# Patient Record
(demographics unavailable — no encounter records)

---

## 2024-10-31 NOTE — REASON FOR VISIT
Improving   Lost 20 pounds on mounjaro  Continue diet, exercise, portion control    [Initial] : an initial visit [Sleep Apnea] : sleep apnea [Obesity] : obesity [TextBox_44] : Prior covid infection.

## 2024-10-31 NOTE — REVIEW OF SYSTEMS
[Fatigue] : fatigue [Thyroid Problem] : thyroid problem [Obesity] : obesity [Negative] : Psychiatric [TextBox_119] : h/o vertigo

## 2024-10-31 NOTE — CONSULT LETTER
[Dear  ___] : Dear  [unfilled], [Consult Letter:] : I had the pleasure of evaluating your patient, [unfilled]. [Please see my note below.] : Please see my note below. [Consult Closing:] : Thank you very much for allowing me to participate in the care of this patient.  If you have any questions, please do not hesitate to contact me. [Sincerely,] : Sincerely, [FreeTextEntry3] : Dario Adam MD, FCCP, D. [facility] at [place]

## 2024-10-31 NOTE — HISTORY OF PRESENT ILLNESS
[Snoring] : snoring [Sleepy When Sedentary] : sleepy when sedentary [Initial Evaluation] : an initial evaluation of [Excess Weight] : excess weight [Currently Experiencing] : The patient is currently experiencing symptoms. [None] : No associated symptoms are reported [Low Calorie Diet] : low calorie diet [Fair Compliance] : fair compliance with treatment [Fair Tolerance] : fair tolerance of treatment [Fair Symptom Control] : fair symptom control [Sleep Apnea] : sleep apnea [Hypertension] : hypertension [TextBox_4] : Never smoker. S/p Covid infection 5/2022 with sinus congestion and loss of taste and smell with eventual resolution of symptoms. Pt denies any h/o asthma, COPD or significant smoking hx. Denies significant respiratory complaints. H/o bicuspid valve and aortic aneurysm and is following with cardiology and CT surgery and may require surgery in near future. Trying to lose weight and lost 40 lbs. Pt with mild sleep complaints and borderline RUTH with an AHI of 5.1. Given mild complaints, he does not want to proceed with therapy which is reasonable.   [Excessive Daytime Sleepiness] : no excessive daytime sleepiness [Witnessed Apnea During Sleep] : no witnessed apnea during sleep [Witnessed Gasping During Sleep] : no witnessed gasping during sleep [Unrefreshing Sleep] : no unrefreshing sleep [ESS] : 7 [TextBox_11] : 4

## 2024-10-31 NOTE — END OF VISIT
[Time Spent: ___ minutes] : I have spent [unfilled] minutes of time on the encounter which excludes teaching and separately reported services. [TextEntry] : Discussed with pt at length regarding borderline RUTH, obesity, prior Covid infection; reviewed w/u with pt as above.

## 2024-10-31 NOTE — DISCUSSION/SUMMARY
[FreeTextEntry1] : #1. Pt denies any h/o asthma, COPD or significant smoking hx. Denies significant respiratory complaints. #2. The patient does not appear to require chronic BD therapy at this time. #3. Diet and exercise for weight loss. #4. SOBOE is likely at least somewhat related to weight or deconditioning. #5. Home PSG to evaluate for possible RUTH revealed borderline RUTH with an AHI of 5.1 and given pt with mild sleep complaints and borderline RUTH, he does not want to proceed with therapy which is reasonable.  #6. Cardiology and CT surgery f/u for bicuspid valve and aneurysm. #7. Could consider repeat HST with weight loss to evaluate for resolution of borderline RUTH but not necessarily required. #8. S/p Covid vaccines and boosters. #9. F/u as needed.   The patient expressed understanding and agreement with the above recommendations/plan and accepts responsibility to be compliant with recommended testing, therapies, and f/u visits. All relevant questions and concerns were addressed.

## 2024-10-31 NOTE — PROCEDURE
[FreeTextEntry1] : Pt denies any h/o asthma, COPD or significant smoking hx. Denies significant respiratory complaints.

## 2024-10-31 NOTE — PHYSICAL EXAM
[No Acute Distress] : no acute distress [Normal Appearance] : normal appearance [Supple] : supple [Normal Rate/Rhythm] : normal rate/rhythm [Normal S1, S2] : normal s1, s2 [No Murmurs] : no murmurs [No Resp Distress] : no resp distress [No Acc Muscle Use] : no acc muscle use [Normal Rhythm and Effort] : normal rhythm and effort [Clear to Auscultation Bilaterally] : clear to auscultation bilaterally [No Abnormalities] : no abnormalities [Benign] : benign [Not Tender] : not tender [Soft] : soft [No Clubbing] : no clubbing [No Edema] : no edema [No Focal Deficits] : no focal deficits [Oriented x3] : oriented x3 [Low Lying Soft Palate] : low lying soft palate [Enlarged Base of the Tongue] : enlarged base of the tongue [II] : Mallampati Class: II [TextBox_11] : moderate oropharyngeal crowding.

## 2025-02-10 NOTE — HISTORY OF PRESENT ILLNESS
[FreeTextEntry1] : wellness exam [de-identified] : MR. CORTEZ IS A PLEASANT 65 YO PRESENTING FOR YEARLY WELLNESS EXAM.  IS FEELING WELL TODAY. CARDIOTHORACIC SURGERY: DR. SILVEIRA - IN APRIL PLANS FOR AORTIC VALVE REPLACEMENT AND ASCENDING AORTA REPLACEMENT CARDIOLOGY: DR. LUNDY PULMONOLOGY: DR. BURLESON OPHTHALMOLOGY: BART ASTORGA MD RADIATION ONCOLOGY: DR. GREEN UROLOGY: DR. WELCH NEUROLOGY: DR. WALTER DERMATOLOGY: DR. ESPINO GI: DR. BLACKWELL

## 2025-02-10 NOTE — HEALTH RISK ASSESSMENT
[Employed] : employed [College] : College [] :  [# Of Children ___] : has [unfilled] children [Designated Healthcare Proxy] : Designated healthcare proxy [Name: ___] : Health Care Proxy's Name: [unfilled]  [Relationship: ___] : Relationship: [unfilled] [Very Good] : ~his/her~  mood as very good [No] : In the past 12 months have you used drugs other than those required for medical reasons? No [Little interest or pleasure doing things] : 1) Little interest or pleasure doing things [Feeling down, depressed, or hopeless] : 2) Feeling down, depressed, or hopeless [0] : 2) Feeling down, depressed, or hopeless: Not at all (0) [PHQ-2 Negative - No further assessment needed] : PHQ-2 Negative - No further assessment needed [Never] : Never [No falls in past year] : Patient reported no falls in the past year [NO] : No [HIV test declined] : HIV test declined [Hepatitis C test declined] : Hepatitis C test declined [None] : None [With Family] : lives with family [# of Members in Household ___] :  household currently consist of [unfilled] member(s) [Feels Safe at Home] : Feels safe at home [Reports normal functional visual acuity (ie: able to read med bottle)] : Reports normal functional visual acuity [Smoke Detector] : smoke detector [Carbon Monoxide Detector] : carbon monoxide detector [Safety elements used in home] : safety elements used in home [Seat Belt] :  uses seat belt [With Patient/Caregiver] : , with patient/caregiver [de-identified] : NOT ROUTINELY EXERCISING ASIDE OCCASSIONAL WALKING [de-identified] : TRIES TO MAKE GOOD CHOICES, LOW SODIUM, LEAN PROTEIN, FRUITS AND VEGGETABLES [QCO2Zrwgb] : 0 [Change in mental status noted] : No change in mental status noted [Language] : denies difficulty with language [Learning/Retaining New Information] : denies difficulty learning/retaining new information [Handling Complex Tasks] : denies difficulty handling complex tasks [Sexually Active] : not sexually active [High Risk Behavior] : no high risk behavior [Reports changes in hearing] : Reports no changes in hearing [Reports changes in vision] : Reports no changes in vision [Reports changes in dental health] : Reports no changes in dental health [Sunscreen] : does not use sunscreen [Travel to Developing Areas] : does not  travel to developing areas [TB Exposure] : is not being exposed to tuberculosis [Caregiver Concerns] : does not have caregiver concerns [ColonoscopyDate] : 12/18 [de-identified] : [employer] FOR DISTANCE AND READING [AdvancecareDate] : 02/25 [FreeTextEntry4] : SECONDARY HEALTH CARE PROXY: DAUGHTER; MIMI DANA

## 2025-02-10 NOTE — PLAN
[FreeTextEntry1] : VISION SCREENING SAFETY / HEALTHY HABITS REVIEWED HEALTHY DIET AND LIFESTYLE MODIFICATIONS VACCINATIONS DISCUSSED: HAD COVID BOOSTER, TDAP, RSV CHECK ROUTINE LAB WORK; HAS PSA MONITORED WITH UROLOGY AS WELL AS U/A FOLLOW-UP ALL SPECIALISTS AS DIRECTED NEED CONSULTANT NOTES FROM CARDIOLOGY AND CARDIOTHORACIC SURGERY AS WELL AS NEUROLOGY THYROID US PREVIOUSLY ORDERED TO BE DONE CALL WITH ANY QUESTIONS, CONCERNS OR CHANGES

## 2025-03-26 NOTE — HISTORY OF PRESENT ILLNESS
[de-identified] : AORTIC VALVE REPLACEMENT PLANNED BEGINNING OF APRIL. NO NSAIDS.  STAYING HYDRATED.

## 2025-05-08 NOTE — HISTORY OF PRESENT ILLNESS
[FreeTextEntry1] : 66-year-old man with CKD, BPH, HTN, CIDP, obesity, bicuspid aortic valve, prostate cancer s/p radiation therapy, bicuspid aortic valve sp AVR, MVR, resection aneurysm of aorta on 04/03/2025 at [facility] by Dr Carmela Vega. pt was discharged home on 04/08. pt reports he had post op-afib. Pt seen and examined; accompanied by . Pt states he feels well. No new complaint at this time.  reports Bp has been running low- he has a home care nurse who monitors BP He has been on Metoprolol 25 mg bid. Lasix has been stopped.

## 2025-05-08 NOTE — PHYSICAL EXAM
[General Appearance - Alert] : alert [General Appearance - In No Acute Distress] : in no acute distress [Hearing Threshold Finger Rub Not [place]] : hearing was normal [Auscultation Breath Sounds / Voice Sounds] : lungs were clear to auscultation bilaterally [Heart Sounds] : normal S1 and S2 [Edema] : there was no peripheral edema [No CVA Tenderness] : no ~M costovertebral angle tenderness [Abnormal Walk] : normal gait [] : no rash [No Focal Deficits] : no focal deficits [Oriented To Time, Place, And Person] : oriented to person, place, and time [Impaired Insight] : insight and judgment were intact [FreeTextEntry1] : wears vision glasses [Abdomen Tenderness] : non-tender

## 2025-05-08 NOTE — ASSESSMENT
[FreeTextEntry1] : CKD stage III SCr has 1.3 in 2013- baseline 1.3-1.4- likely CRS type II SCr ~ 1.5 on most recent labs- had post Op Edmar- SCr now stabilized at 1.5 Obtain UA, UACR obtain Renal US  BP low-  managed by cards off diuretics- euvolemic on exam  Afib on Metoprolol and AC